# Patient Record
Sex: MALE | Race: WHITE | Employment: OTHER | RURAL
[De-identification: names, ages, dates, MRNs, and addresses within clinical notes are randomized per-mention and may not be internally consistent; named-entity substitution may affect disease eponyms.]

---

## 2022-11-06 ENCOUNTER — APPOINTMENT (OUTPATIENT)
Dept: GENERAL RADIOLOGY | Age: 62
End: 2022-11-06
Attending: EMERGENCY MEDICINE
Payer: COMMERCIAL

## 2022-11-06 ENCOUNTER — HOSPITAL ENCOUNTER (OUTPATIENT)
Age: 62
Setting detail: OBSERVATION
Discharge: HOME | End: 2022-11-07
Attending: EMERGENCY MEDICINE | Admitting: INTERNAL MEDICINE
Payer: COMMERCIAL

## 2022-11-06 DIAGNOSIS — R55 SYNCOPE AND COLLAPSE: Primary | ICD-10-CM

## 2022-11-06 LAB
ANION GAP SERPL CALC-SCNC: 12 MMOL/L (ref 5–15)
APPEARANCE UR: CLEAR
BACTERIA URNS QL MICRO: NEGATIVE /HPF
BASOPHILS # BLD: 0 K/UL (ref 0–0.1)
BASOPHILS NFR BLD: 0 % (ref 0–1)
BILIRUB UR QL: NEGATIVE
BUN SERPL-MCNC: 23 MG/DL (ref 6–20)
BUN/CREAT SERPL: 22 (ref 12–20)
CALCIUM SERPL-MCNC: 9.2 MG/DL (ref 8.5–10.1)
CHLORIDE SERPL-SCNC: 101 MMOL/L (ref 97–108)
CO2 SERPL-SCNC: 29 MMOL/L (ref 21–32)
COLOR UR: NORMAL
CREAT SERPL-MCNC: 1.04 MG/DL (ref 0.7–1.3)
DIFFERENTIAL METHOD BLD: NORMAL
EOSINOPHIL # BLD: 0.1 K/UL (ref 0–0.4)
EOSINOPHIL NFR BLD: 1 % (ref 0–7)
EPITH CASTS URNS QL MICRO: NORMAL /LPF
ERYTHROCYTE [DISTWIDTH] IN BLOOD BY AUTOMATED COUNT: 12.3 % (ref 11.5–14.5)
ETHANOL SERPL-MCNC: <10 MG/DL
GLUCOSE SERPL-MCNC: 124 MG/DL (ref 65–100)
GLUCOSE UR STRIP.AUTO-MCNC: NEGATIVE MG/DL
HCT VFR BLD AUTO: 37.8 % (ref 36.6–50.3)
HGB BLD-MCNC: 13 G/DL (ref 12.1–17)
HGB UR QL STRIP: NEGATIVE
IMM GRANULOCYTES # BLD AUTO: 0 K/UL (ref 0–0.04)
IMM GRANULOCYTES NFR BLD AUTO: 0 % (ref 0–0.5)
KETONES UR QL STRIP.AUTO: NEGATIVE MG/DL
LEUKOCYTE ESTERASE UR QL STRIP.AUTO: NEGATIVE
LYMPHOCYTES # BLD: 1.7 K/UL (ref 0.8–3.5)
LYMPHOCYTES NFR BLD: 23 % (ref 12–49)
MCH RBC QN AUTO: 31.3 PG (ref 26–34)
MCHC RBC AUTO-ENTMCNC: 34.4 G/DL (ref 30–36.5)
MCV RBC AUTO: 90.9 FL (ref 80–99)
MONOCYTES # BLD: 0.6 K/UL (ref 0–1)
MONOCYTES NFR BLD: 9 % (ref 5–13)
NEUTS SEG # BLD: 4.9 K/UL (ref 1.8–8)
NEUTS SEG NFR BLD: 67 % (ref 32–75)
NITRITE UR QL STRIP.AUTO: NEGATIVE
NRBC # BLD: 0 K/UL (ref 0–0.01)
NRBC BLD-RTO: 0 PER 100 WBC
PH UR STRIP: 6.5 [PH] (ref 5–8)
PLATELET # BLD AUTO: 169 K/UL (ref 150–400)
PMV BLD AUTO: 9.6 FL (ref 8.9–12.9)
POTASSIUM SERPL-SCNC: 3.5 MMOL/L (ref 3.5–5.1)
PROT UR STRIP-MCNC: NEGATIVE MG/DL
RBC # BLD AUTO: 4.16 M/UL (ref 4.1–5.7)
RBC #/AREA URNS HPF: NORMAL /HPF (ref 0–5)
SODIUM SERPL-SCNC: 142 MMOL/L (ref 136–145)
SP GR UR REFRACTOMETRY: 1.01 (ref 1–1.03)
TROPONIN-HIGH SENSITIVITY: 7 NG/L (ref 0–76)
UA: UC IF INDICATED,UAUC: NORMAL
UROBILINOGEN UR QL STRIP.AUTO: 0.2 EU/DL (ref 0.2–1)
WBC # BLD AUTO: 7.3 K/UL (ref 4.1–11.1)
WBC URNS QL MICRO: NORMAL /HPF (ref 0–4)

## 2022-11-06 PROCEDURE — 36415 COLL VENOUS BLD VENIPUNCTURE: CPT

## 2022-11-06 PROCEDURE — 80048 BASIC METABOLIC PNL TOTAL CA: CPT

## 2022-11-06 PROCEDURE — 84484 ASSAY OF TROPONIN QUANT: CPT

## 2022-11-06 PROCEDURE — G0378 HOSPITAL OBSERVATION PER HR: HCPCS

## 2022-11-06 PROCEDURE — 93005 ELECTROCARDIOGRAM TRACING: CPT

## 2022-11-06 PROCEDURE — 85025 COMPLETE CBC W/AUTO DIFF WBC: CPT

## 2022-11-06 PROCEDURE — 74011250636 HC RX REV CODE- 250/636: Performed by: EMERGENCY MEDICINE

## 2022-11-06 PROCEDURE — 81001 URINALYSIS AUTO W/SCOPE: CPT

## 2022-11-06 PROCEDURE — 82077 ASSAY SPEC XCP UR&BREATH IA: CPT

## 2022-11-06 PROCEDURE — 99285 EMERGENCY DEPT VISIT HI MDM: CPT

## 2022-11-06 PROCEDURE — 71045 X-RAY EXAM CHEST 1 VIEW: CPT

## 2022-11-06 RX ORDER — ACETAMINOPHEN 325 MG/1
650 TABLET ORAL
Status: DISCONTINUED | OUTPATIENT
Start: 2022-11-06 | End: 2022-11-07 | Stop reason: HOSPADM

## 2022-11-06 RX ORDER — FLUTICASONE FUROATE 27.5 UG/1
2 SPRAY, METERED NASAL DAILY
COMMUNITY

## 2022-11-06 RX ORDER — OLMESARTAN MEDOXOMIL AND HYDROCHLOROTHIAZIDE 40/25 40; 25 MG/1; MG/1
1 TABLET ORAL DAILY
COMMUNITY
End: 2022-11-07

## 2022-11-06 RX ORDER — ASPIRIN 81 MG/1
81 TABLET ORAL DAILY
COMMUNITY

## 2022-11-06 RX ORDER — HYDRALAZINE HYDROCHLORIDE 20 MG/ML
10 INJECTION INTRAMUSCULAR; INTRAVENOUS
Status: DISCONTINUED | OUTPATIENT
Start: 2022-11-06 | End: 2022-11-07

## 2022-11-06 RX ORDER — ATORVASTATIN CALCIUM 20 MG/1
20 TABLET, FILM COATED ORAL DAILY
COMMUNITY

## 2022-11-06 RX ORDER — CETIRIZINE HYDROCHLORIDE 10 MG/1
10 TABLET ORAL DAILY
COMMUNITY

## 2022-11-06 RX ADMIN — SODIUM CHLORIDE 1000 ML: 9 INJECTION, SOLUTION INTRAVENOUS at 18:37

## 2022-11-06 NOTE — ED PROVIDER NOTES
EMERGENCY DEPARTMENT HISTORY AND PHYSICAL EXAM      Date: 11/6/2022  Patient Name: Laureen Miller    History of Presenting Illness     Chief Complaint   Patient presents with    Syncope       History Provided By: Patient    HPI: Laureen Miller, 57 y.o. male with PMHx significant for hypertension, high cholesterol, presents via EMS to the ED with cc of syncope. Patient reports he was working outside all day. He then went to party this evening. He reports he had several beers. He then reports he became very hot and lightheaded and then he passed out. Event was witnessed by bystanders. He was assisted back up and then passed out again. Bystanders attempted to find a pulse and were unable to find a radial pulse and started CPR. However after about 5 compressions he woke up. He denies any chest pain or shortness of breath. He is currently without complaints. He thinks he might of been dehydrated from working outside in the heat all day. Despite being in November it went up to 80 degrees today. He denies any abdominal pain. No nausea or vomiting. PMHx: Significant for hypertension, high cholesterol  PSHx: No pertinent past surgical history  Social Hx: non Smoker. Occasionally drinks alcohol. There are no other complaints, changes, or physical findings at this time. PCP: Carli Luna MD    No current facility-administered medications on file prior to encounter. Current Outpatient Medications on File Prior to Encounter   Medication Sig Dispense Refill    cetirizine (ZyrTEC) 10 mg tablet Take 10 mg by mouth daily. atorvastatin (LIPITOR) 20 mg tablet Take 20 mg by mouth daily. fluticasone (Flonase Sensimist) 27.5 mcg/actuation nasal spray 2 Sprays by Nasal route daily. aspirin delayed-release 81 mg tablet Take 81 mg by mouth daily.          Past History     Past Medical History:  Past Medical History:   Diagnosis Date    Hyperlipidemia     Hypertension        Past Surgical History:  History reviewed. No pertinent surgical history. Family History:  History reviewed. No pertinent family history. Social History:  Social History     Tobacco Use    Smoking status: Never    Smokeless tobacco: Never   Substance Use Topics    Alcohol use: Yes    Drug use: Never       Allergies:  No Known Allergies      Review of Systems   Review of Systems   Constitutional:  Negative for activity change, chills and fever. HENT:  Negative for congestion and sore throat. Eyes:  Negative for pain and redness. Respiratory:  Negative for cough, chest tightness and shortness of breath. Cardiovascular:  Negative for chest pain and palpitations. Gastrointestinal:  Negative for abdominal pain, diarrhea, nausea and vomiting. Genitourinary:  Negative for dysuria, frequency and urgency. Musculoskeletal:  Negative for back pain and neck pain. Skin:  Negative for rash. Neurological:  Positive for syncope and light-headedness. Negative for headaches. Psychiatric/Behavioral:  Negative for confusion. All other systems reviewed and are negative. Physical Exam   Physical Exam  Vitals and nursing note reviewed. Constitutional:       General: He is not in acute distress. Appearance: He is well-developed. He is not diaphoretic. HENT:      Head: Normocephalic. Nose: Nose normal.      Mouth/Throat:      Pharynx: No oropharyngeal exudate. Eyes:      General: No scleral icterus. Conjunctiva/sclera: Conjunctivae normal.      Pupils: Pupils are equal, round, and reactive to light. Neck:      Thyroid: No thyromegaly. Vascular: No JVD. Trachea: No tracheal deviation. Cardiovascular:      Rate and Rhythm: Normal rate and regular rhythm. Heart sounds: No murmur heard. No friction rub. No gallop. Pulmonary:      Effort: Pulmonary effort is normal. No respiratory distress. Breath sounds: Normal breath sounds. No stridor. No wheezing or rales.    Abdominal: General: Bowel sounds are normal. There is no distension. Palpations: Abdomen is soft. Tenderness: There is no abdominal tenderness. There is no guarding or rebound. Musculoskeletal:         General: Normal range of motion. Cervical back: Normal range of motion and neck supple. Lymphadenopathy:      Cervical: No cervical adenopathy. Skin:     General: Skin is warm and dry. Findings: No erythema or rash. Neurological:      Mental Status: He is alert and oriented to person, place, and time. Cranial Nerves: No cranial nerve deficit. Motor: No abnormal muscle tone. Coordination: Coordination normal.   Psychiatric:         Behavior: Behavior normal.           Diagnostic Study Results     Labs -     No results found for this or any previous visit (from the past 12 hour(s)). Radiologic Studies -   DUPLEX CAROTID BILATERAL   Final Result      MRI BRAIN WO CONT   Final Result   1. No acute or significant intracranial abnormality. XR CHEST SNGL V   Final Result   Dilated bowel loops elevating the left hemidiaphragm. CT Results  (Last 48 hours)      None          CXR Results  (Last 48 hours)      None              Medical Decision Making   I am the first provider for this patient. I reviewed the vital signs, available nursing notes, past medical history, past surgical history, family history and social history. Vital Signs-Reviewed the patient's vital signs. No data found. Pulse Oximetry Analysis - 97% on RA    Cardiac Monitor:   Rate: 87 bpm  Rhythm: Normal Sinus Rhythm        ED EKG interpretation: 5:51 PM  Rhythm: normal sinus rhythm; and regular . Rate (approx.): 92; Axis: normal; AK Interval: normal; QRS interval: normal ; ST/T wave: non-specific changes; This EKG was interpreted by Dave Mark MD,ED Provider.       Records Reviewed: Nursing Notes and Ambulance Run Sheet    Provider Notes (Medical Decision Making):   DDX: dehydration, orthostatic hypotension, acs, dysrhythmia, electrolyte abnormality, alcohol intoxication  ED Course:   Initial assessment performed. The patients presenting problems have been discussed, and they are in agreement with the care plan formulated and outlined with them. I have encouraged them to ask questions as they arise throughout their visit. ED Course as of 11/09/22 0814   Ilda Palma Nov 06, 2022   1902 To the hospitalist Dr. Alesha Henley. Will admit to hospitalist service for observation for syncopal event. Work-up unremarkable. CBC and CMP unremarkable other than BUN and creatinine ratio slightly elevated. Suggesting dehydration. Negative troponin. No acute EKG changes. Negative alcohol level. [CH]          ED Course User Index  [CH] Benjamin Haley MD  [VG] Kobe Martini MD                   I have also put together some discharge instructions for them that include: 1) educational information regarding their diagnosis, 2) how to care for their diagnosis at home, as well a 3) list of reasons why they would want to return to the ED prior to their follow-up appointment, should their condition change. Written by Tesfaye Reyes MD        Critical Care Time:   0    Disposition:  Admit    PLAN:  1. Discharge Medication List as of 11/7/2022  3:48 PM        START taking these medications    Details   olmesartan (Benicar) 40 mg tablet Take 1 Tablet by mouth daily. , No Print, Disp-1 Tablet, R-0           CONTINUE these medications which have NOT CHANGED    Details   cetirizine (ZyrTEC) 10 mg tablet Take 10 mg by mouth daily. , Historical Med      atorvastatin (LIPITOR) 20 mg tablet Take 20 mg by mouth daily. , Historical Med      fluticasone (Flonase Sensimist) 27.5 mcg/actuation nasal spray 2 Sprays by Nasal route daily. , Historical Med      aspirin delayed-release 81 mg tablet Take 81 mg by mouth daily. , Historical Med           STOP taking these medications       olmesartan-hydroCHLOROthiazide (BENICAR HCT) 40-25 mg per tablet Comments:   Reason for Stoppin.   Follow-up Information       Follow up With Specialties Details Why Laurence Araujo MD Family Medicine   [address] [facility] [address] [id number] [phone]      Roger Gupta MD Family Medicine Follow up in 4 day(s) Follow-up @ 1:45. Bring insurance card and ID with you. [address] [place], [place] [id number] [phone]            Return to ED if worse     Diagnosis     Clinical Impression:   1. Syncope and collapse              Please note that this dictation was completed with Uni2, the computer voice recognition software. Quite often unanticipated grammatical, syntax, homophones, and other interpretive errors are inadvertently transcribed by the computer software. Please disregard these errors. Please excuse any errors that have escaped final proofreading.

## 2022-11-06 NOTE — ED TRIAGE NOTES
Pt arrives via EMS after experiencing a syncopal episode. He states he was at a party and suddenly felt hot and had LOC. Bystander CPR initiated d/t reportedly \"slow pulse\". Pt was arouasable, he sat up and witness reports that he had LOC again. Vitals stable en route, pt A/Ox4 upon arrival with no complaints.

## 2022-11-06 NOTE — Clinical Note
Patient Class[de-identified] OBSERVATION [104]   Type of Bed: Remote Telemetry [29]   Cardiac Monitoring Required?: Yes   Reason for Observation: syncope   Admitting Diagnosis: Syncope [402811]   Admitting Physician: Jabari Renee [[id number]]   Attending Physician: Jabari Renee [[id number]]

## 2022-11-07 ENCOUNTER — APPOINTMENT (OUTPATIENT)
Dept: ULTRASOUND IMAGING | Age: 62
End: 2022-11-07
Attending: INTERNAL MEDICINE
Payer: COMMERCIAL

## 2022-11-07 ENCOUNTER — HOSPITAL ENCOUNTER (OUTPATIENT)
Dept: NON INVASIVE DIAGNOSTICS | Age: 62
Setting detail: OBSERVATION
Discharge: HOME | End: 2022-11-07
Attending: INTERNAL MEDICINE
Payer: COMMERCIAL

## 2022-11-07 ENCOUNTER — TRANSCRIBE ORDER (OUTPATIENT)
Dept: SCHEDULING | Age: 62
End: 2022-11-07

## 2022-11-07 ENCOUNTER — TELEPHONE (OUTPATIENT)
Dept: FAMILY MEDICINE CLINIC | Age: 62
End: 2022-11-07

## 2022-11-07 ENCOUNTER — APPOINTMENT (OUTPATIENT)
Dept: MRI IMAGING | Age: 62
End: 2022-11-07
Attending: INTERNAL MEDICINE
Payer: COMMERCIAL

## 2022-11-07 VITALS
BODY MASS INDEX: 30.99 KG/M2 | SYSTOLIC BLOOD PRESSURE: 170 MMHG | WEIGHT: 221.34 LBS | RESPIRATION RATE: 20 BRPM | DIASTOLIC BLOOD PRESSURE: 89 MMHG | OXYGEN SATURATION: 96 % | TEMPERATURE: 97.5 F | HEIGHT: 71 IN | HEART RATE: 62 BPM

## 2022-11-07 DIAGNOSIS — R55 SYNCOPE: ICD-10-CM

## 2022-11-07 DIAGNOSIS — R55 SYNCOPE: Primary | ICD-10-CM

## 2022-11-07 PROBLEM — E78.5 HYPERLIPIDEMIA: Chronic | Status: ACTIVE | Noted: 2022-11-07

## 2022-11-07 PROBLEM — I10 PRIMARY HYPERTENSION: Chronic | Status: ACTIVE | Noted: 2022-11-07

## 2022-11-07 LAB
AMPHET UR QL SCN: NEGATIVE
ANION GAP SERPL CALC-SCNC: 7 MMOL/L (ref 5–15)
ATRIAL RATE: 92 BPM
BARBITURATES UR QL SCN: NEGATIVE
BASOPHILS # BLD: 0 K/UL (ref 0–0.1)
BASOPHILS NFR BLD: 0 % (ref 0–1)
BENZODIAZ UR QL: NEGATIVE
BUN SERPL-MCNC: 17 MG/DL (ref 6–20)
BUN/CREAT SERPL: 15 (ref 12–20)
CALCIUM SERPL-MCNC: 9.5 MG/DL (ref 8.5–10.1)
CALCULATED P AXIS, ECG09: 48 DEGREES
CALCULATED R AXIS, ECG10: 22 DEGREES
CALCULATED T AXIS, ECG11: 23 DEGREES
CANNABINOIDS UR QL SCN: POSITIVE
CHLORIDE SERPL-SCNC: 104 MMOL/L (ref 97–108)
CO2 SERPL-SCNC: 31 MMOL/L (ref 21–32)
COCAINE UR QL SCN: NEGATIVE
CREAT SERPL-MCNC: 1.11 MG/DL (ref 0.7–1.3)
D DIMER PPP FEU-MCNC: 0.34 MG/L FEU (ref 0–0.65)
DIAGNOSIS, 93000: NORMAL
DIFFERENTIAL METHOD BLD: NORMAL
DRUG SCRN COMMENT,DRGCM: ABNORMAL
EOSINOPHIL # BLD: 0.1 K/UL (ref 0–0.4)
EOSINOPHIL NFR BLD: 1 % (ref 0–7)
ERYTHROCYTE [DISTWIDTH] IN BLOOD BY AUTOMATED COUNT: 12.4 % (ref 11.5–14.5)
GLUCOSE SERPL-MCNC: 147 MG/DL (ref 65–100)
HCT VFR BLD AUTO: 38.2 % (ref 36.6–50.3)
HGB BLD-MCNC: 13.2 G/DL (ref 12.1–17)
IMM GRANULOCYTES # BLD AUTO: 0 K/UL (ref 0–0.04)
IMM GRANULOCYTES NFR BLD AUTO: 0 % (ref 0–0.5)
LEFT CCA DIST DIAS: 15.8 CM/S
LEFT CCA DIST SYS: 72.1 CM/S
LEFT CCA PROX DIAS: 21.5 CM/S
LEFT CCA PROX SYS: 100.3 CM/S
LEFT ECA DIAS: 12.76 CM/S
LEFT ECA SYS: 91.9 CM/S
LEFT ICA DIST DIAS: 18.9 CM/S
LEFT ICA DIST SYS: 52.6 CM/S
LEFT ICA MID DIAS: 18.2 CM/S
LEFT ICA MID SYS: 54.7 CM/S
LEFT ICA PROX DIAS: 16.5 CM/S
LEFT ICA PROX SYS: 76.8 CM/S
LEFT ICA/CCA SYS: 1.07 NO UNITS
LEFT VERTEBRAL DIAS: 12.83 CM/S
LEFT VERTEBRAL SYS: 53.8 CM/S
LYMPHOCYTES # BLD: 1.4 K/UL (ref 0.8–3.5)
LYMPHOCYTES NFR BLD: 25 % (ref 12–49)
MAGNESIUM SERPL-MCNC: 2.2 MG/DL (ref 1.6–2.4)
MCH RBC QN AUTO: 31.6 PG (ref 26–34)
MCHC RBC AUTO-ENTMCNC: 34.6 G/DL (ref 30–36.5)
MCV RBC AUTO: 91.4 FL (ref 80–99)
METHADONE UR QL: NEGATIVE
MONOCYTES # BLD: 0.4 K/UL (ref 0–1)
MONOCYTES NFR BLD: 7 % (ref 5–13)
NEUTS SEG # BLD: 3.6 K/UL (ref 1.8–8)
NEUTS SEG NFR BLD: 67 % (ref 32–75)
NRBC # BLD: 0 K/UL (ref 0–0.01)
NRBC BLD-RTO: 0 PER 100 WBC
OPIATES UR QL: NEGATIVE
P-R INTERVAL, ECG05: 160 MS
PCP UR QL: NEGATIVE
PLATELET # BLD AUTO: 164 K/UL (ref 150–400)
PMV BLD AUTO: 9.6 FL (ref 8.9–12.9)
POTASSIUM SERPL-SCNC: 3.8 MMOL/L (ref 3.5–5.1)
Q-T INTERVAL, ECG07: 380 MS
QRS DURATION, ECG06: 98 MS
QTC CALCULATION (BEZET), ECG08: 469 MS
RBC # BLD AUTO: 4.18 M/UL (ref 4.1–5.7)
RIGHT CCA DIST DIAS: 19.2 CM/S
RIGHT CCA DIST SYS: 98.4 CM/S
RIGHT CCA PROX DIAS: 23.8 CM/S
RIGHT CCA PROX SYS: 91.4 CM/S
RIGHT ECA DIAS: 12.52 CM/S
RIGHT ECA SYS: 97 CM/S
RIGHT ICA DIST DIAS: 24.6 CM/S
RIGHT ICA DIST SYS: 66.1 CM/S
RIGHT ICA MID DIAS: 17.8 CM/S
RIGHT ICA MID SYS: 54.9 CM/S
RIGHT ICA PROX DIAS: 22.4 CM/S
RIGHT ICA PROX SYS: 91.3 CM/S
RIGHT ICA/CCA SYS: 0.9 NO UNITS
RIGHT VERTEBRAL DIAS: 17.6 CM/S
RIGHT VERTEBRAL SYS: 46.7 CM/S
SODIUM SERPL-SCNC: 142 MMOL/L (ref 136–145)
TROPONIN-HIGH SENSITIVITY: 7 NG/L (ref 0–76)
VENTRICULAR RATE, ECG03: 92 BPM
WBC # BLD AUTO: 5.5 K/UL (ref 4.1–11.1)

## 2022-11-07 PROCEDURE — 85025 COMPLETE CBC W/AUTO DIFF WBC: CPT

## 2022-11-07 PROCEDURE — 70551 MRI BRAIN STEM W/O DYE: CPT

## 2022-11-07 PROCEDURE — G0378 HOSPITAL OBSERVATION PER HR: HCPCS

## 2022-11-07 PROCEDURE — 93271 ECG/MONITORING AND ANALYSIS: CPT

## 2022-11-07 PROCEDURE — 74011250636 HC RX REV CODE- 250/636: Performed by: INTERNAL MEDICINE

## 2022-11-07 PROCEDURE — 80048 BASIC METABOLIC PNL TOTAL CA: CPT

## 2022-11-07 PROCEDURE — 83735 ASSAY OF MAGNESIUM: CPT

## 2022-11-07 PROCEDURE — 93880 EXTRACRANIAL BILAT STUDY: CPT

## 2022-11-07 PROCEDURE — 84484 ASSAY OF TROPONIN QUANT: CPT

## 2022-11-07 PROCEDURE — 74011250637 HC RX REV CODE- 250/637: Performed by: INTERNAL MEDICINE

## 2022-11-07 PROCEDURE — 36415 COLL VENOUS BLD VENIPUNCTURE: CPT

## 2022-11-07 PROCEDURE — 93306 TTE W/DOPPLER COMPLETE: CPT

## 2022-11-07 PROCEDURE — 94760 N-INVAS EAR/PLS OXIMETRY 1: CPT

## 2022-11-07 PROCEDURE — 80307 DRUG TEST PRSMV CHEM ANLYZR: CPT

## 2022-11-07 PROCEDURE — 96372 THER/PROPH/DIAG INJ SC/IM: CPT

## 2022-11-07 PROCEDURE — 85379 FIBRIN DEGRADATION QUANT: CPT

## 2022-11-07 RX ORDER — OLMESARTAN MEDOXOMIL 40 MG/1
40 TABLET ORAL DAILY
Qty: 1 TABLET | Refills: 0 | Status: SHIPPED
Start: 2022-11-07

## 2022-11-07 RX ORDER — SODIUM CHLORIDE 0.9 % (FLUSH) 0.9 %
5-40 SYRINGE (ML) INJECTION AS NEEDED
Status: DISCONTINUED | OUTPATIENT
Start: 2022-11-07 | End: 2022-11-07 | Stop reason: HOSPADM

## 2022-11-07 RX ORDER — ASPIRIN 81 MG/1
81 TABLET ORAL DAILY
Status: DISCONTINUED | OUTPATIENT
Start: 2022-11-07 | End: 2022-11-07 | Stop reason: HOSPADM

## 2022-11-07 RX ORDER — POLYETHYLENE GLYCOL 3350 17 G/17G
17 POWDER, FOR SOLUTION ORAL DAILY PRN
Status: DISCONTINUED | OUTPATIENT
Start: 2022-11-07 | End: 2022-11-07 | Stop reason: HOSPADM

## 2022-11-07 RX ORDER — ENOXAPARIN SODIUM 100 MG/ML
40 INJECTION SUBCUTANEOUS DAILY
Status: DISCONTINUED | OUTPATIENT
Start: 2022-11-07 | End: 2022-11-07 | Stop reason: HOSPADM

## 2022-11-07 RX ORDER — ONDANSETRON 2 MG/ML
4 INJECTION INTRAMUSCULAR; INTRAVENOUS
Status: DISCONTINUED | OUTPATIENT
Start: 2022-11-07 | End: 2022-11-07 | Stop reason: HOSPADM

## 2022-11-07 RX ORDER — ONDANSETRON 4 MG/1
4 TABLET, ORALLY DISINTEGRATING ORAL
Status: DISCONTINUED | OUTPATIENT
Start: 2022-11-07 | End: 2022-11-07 | Stop reason: HOSPADM

## 2022-11-07 RX ORDER — FLUTICASONE PROPIONATE 50 MCG
2 SPRAY, SUSPENSION (ML) NASAL
Status: DISCONTINUED | OUTPATIENT
Start: 2022-11-07 | End: 2022-11-07 | Stop reason: HOSPADM

## 2022-11-07 RX ORDER — SODIUM CHLORIDE 0.9 % (FLUSH) 0.9 %
5-40 SYRINGE (ML) INJECTION EVERY 8 HOURS
Status: DISCONTINUED | OUTPATIENT
Start: 2022-11-07 | End: 2022-11-07 | Stop reason: HOSPADM

## 2022-11-07 RX ADMIN — ASPIRIN 81 MG: 81 TABLET, COATED ORAL at 09:44

## 2022-11-07 RX ADMIN — ENOXAPARIN SODIUM 40 MG: 100 INJECTION SUBCUTANEOUS at 09:44

## 2022-11-07 NOTE — ROUTINE PROCESS
Bedside and Verbal shift change report given to Kishore juarez (oncoming nurse) by Mason Peguero (offgoing nurse). Report included the following information SBAR, Kardex, ED Summary, Intake/Output, MAR, Recent Results, and Cardiac Rhythm NSR.

## 2022-11-07 NOTE — ED NOTES
Bedside shift change report given to [address] (oncoming nurse) by Margaret Galeazzi, RN (offgoing nurse). Report included the following information SBAR, Kardex and ED Summary. HHA/Patient/Significant Other

## 2022-11-07 NOTE — PROGRESS NOTES
Bedside shift change report given to LUIS Mendieta RN (oncoming nurse) by Martina Cain RN  (offgoing nurse). Report included the following information to include SBAR, ED summary of events, MAR, VS and cardiac status.

## 2022-11-07 NOTE — ED NOTES
Pt pain assessed. Offered use of restroom and assistance as necessary. Pt declined but is aware of need for admission. Pt offered repositioning in bed for comfort. Assessed pt's ability to access possessions, everything within reach of pt. Pt reminded to use call bell as necessary, report any changes in status. Pt thanked for allowing care. Bed locked in lowest position, side rails up as necessary.

## 2022-11-07 NOTE — PROGRESS NOTES
2209:  Paged provider for VS notification. Left message to return call.     2241: Left message for Dr. Juan David Ya to return call for notification of VS

## 2022-11-07 NOTE — PROGRESS NOTES
Spiritual Care Assessment/Progress Note  Ayaan Galvan      NAME: Liberty Vergara      MRN: [id number]  AGE: 57 y.o.  SEX: male  Latter day Affiliation: Other   Language: English     11/7/2022     Total Time (in minutes): 11     Spiritual Assessment begun in [facility] MED/SURG through conversation with:         [x]Patient        [] Family    [] Friend(s)        Reason for Consult: Initial/Spiritual assessment, patient floor     Spiritual beliefs: (Please include comment if needed)     [x] Identifies with a dinah tradition:         [] Supported by a dinah community:            [] Claims no spiritual orientation:           [] Seeking spiritual identity:                [] Adheres to an individual form of spirituality:           [] Not able to assess:                           Identified resources for coping:      [] Prayer                               [] Music                  [] Guided Imagery     [x] Family/friends                 [] Pet visits     [] Devotional reading                         [] Unknown     [] Other:                                               Interventions offered during this visit: (See comments for more details)    Patient Interventions: Affirmation of emotions/emotional suffering, Affirmation of dinah, Initial/Spiritual assessment, patient floor, Prayer (assurance of)           Plan of Care:     [x] Support spiritual and/or cultural needs    [] Support AMD and/or advance care planning process      [] Support grieving process   [] Coordinate Rites and/or Rituals    [] Coordination with community clergy   [] No spiritual needs identified at this time   [] Detailed Plan of Care below (See Comments)  [] Make referral to Music Therapy  [] Make referral to Pet Therapy     [] Make referral to Addiction services  [] Make referral to [facility]  [] Make referral to Spiritual Care Partner  [] No future visits requested        [] Contact Spiritual Care for further referrals     Comments: INITIAL SPIRITUAL ASSESSMENT IN   Provided empathic listening and spiritual support  Advised of  Availability    [address] [place] [address]

## 2022-11-07 NOTE — PROGRESS NOTES
Discharge instructions reviewed with patient  Personal belongings returned: n/a  Home meds returned: n/a  To front entrance via w/c  Discharged home with  spouse @ [id number]

## 2022-11-07 NOTE — ROUTINE PROCESS
TRANSFER - IN REPORT:    Verbal report received from [facility] (name) on Jose Root  being received from ED (unit) for routine progression of care      Report consisted of patients Situation, Background, Assessment and   Recommendations(SBAR). Information from the following report(s) SBAR, Kardex, ED Summary, and Recent Results was reviewed with the receiving nurse. Opportunity for questions and clarification was provided. Assessment completed upon patients arrival to unit and care assumed.

## 2022-11-07 NOTE — PROGRESS NOTES
Transition of Care (RICHARD) Plan:  Discharged home. No needs identified. Holtor monitor placed after discharged from the unit as outpatient. RICHARD Transportation:      How is patient being transported at discharge? POV/Wife    When?  11/7/22    Is transport scheduled? NA    Follow-up appointment and transportation:     PCP? Hailey Sargent MD     Specialist?    Time/Date? November 11, 2022 1:45PM    Who is transporting to the follow-up appointment? Wife/POV     Is transport for follow up appointment scheduled? NA    Communication plan (with patient/family): Who is being called? Patient     What number(s) is to be used? [phone] (Mobile)        What service provider is calling for [facility] services? PCP office    When are they calling? Probably 24 - 48 hours prior to appointment      Click here to [address] including selection of the Healthcare Decision Maker Relationship (ie \"Primary\")  @[email or web address]     Care Management Interventions  PCP Verified by CM: Yes Hailey Sargent MD)  Last Visit to PCP: 06/15/22  Palliative Care Criteria Met (RRAT>21 & CHF Dx)?: No (No MD order)  Mode of Transport at Discharge:  Other (see comment) (POV)  Transition of Care Consult (CM Consult): Discharge Planning  Discharge Durable Medical Equipment: No  Physical Therapy Consult: No  Occupational Therapy Consult: No  Speech Therapy Consult: No  Support Systems: Spouse/Significant Other  Confirm Follow Up Transport: Family  The Plan for Transition of Care is Related to the Following Treatment Goals : Treat syncope  Discharge Location  Patient Expects to be Discharged to[de-identified] Home

## 2022-11-07 NOTE — ROUTINE PROCESS
Primary Nurse Marlys Jimenez RN and Tasha Noonan RN performed a dual skin assessment on this patient No impairment noted  Nilton score is 23    Pt defer removing shorts; voiced his skin is fine there.

## 2022-11-07 NOTE — PROGRESS NOTES
Problem: Falls - Risk of  Goal: *Absence of Falls  Description: Document Jonah Raza Fall Risk and appropriate interventions in the flowsheet.   Outcome: Resolved/Met

## 2022-11-07 NOTE — TELEPHONE ENCOUNTER
discharge nurse calls to schedule hospital follow up. Pt was in [facility] for Syncope. Follow up should be within 5 dys. Pt scheduled for 11/11/2022. Pt's wife is a recently established pt here and wants pt to see Dr. Gigi Mann as pcp. He would be new to provider for his follow up appt.      Best number for LEA BOYER call is

## 2022-11-07 NOTE — H&P
[facility]   Admission History & Physical        11/7/2022 8:15 AM  Patient: Kristina Norwood 1960  PCP: Stephen Osullivan MD    HISTORY  Chief Complaint:   Chief Complaint   Patient presents with    Syncope       HPI: 57 y.o. male presenting for admission to [facility] for further evaluation and treatment for Syncope. He  has a past medical history of Hyperlipidemia and Hypertension. Patient presented to the ED last evening following an abrupt syncopal episode without provocation. He is followed by his PCP in [place] for treatment of hypertension and hyperlipidemia. His blood pressure is usually controlled at home but slightly elevated during office visits, in the 145/92 range. He has not had problems with dizziness, poor coordination, focal neurologic symptoms, falls. Had no documentation of vascular disease. He had an unremarkable stress test at age 46. He has every 6-month follow-ups with laboratory work being assessed. There is no history of peptic disease or GI bleed. He has no complaint with palpitation or orthostasis. Had symptoms of chest pain, orthopnea, PND, edema. He had a fairly unremarkable day yesterday. He did some chores in the [employer] yesterday afternoon and it was slightly hot and he admits to not drinking fluids too much. He took his routine medications in the morning. Last evening he was at an indoor party and drank 1 beer. His wife was next to him and he suddenly slumped and she assisted him to the floor. He aroused fairly promptly without complaint and remained on the floor for period of time. When attempting to stand up he again became lightheaded diaphoretic and had complete syncope. During that episode his eyes rolled upwards and he was flaccid throughout. There was no loss of bowel or urine continence. There was no tonic-clonic spasm appreciated. He was assessed by several nurses at the party and was not felt to have a palpable pulse.   CPR was initiated and he aroused after several compressions. No focal deficits could be appreciated. He remained on the floor until EMS arrived. Signs were apparently unremarkable. The patient was described as diaphoretic and warm after the second episode. No arrhythmia or hypotension was identified. Laboratory panel was unremarkable. EKG was noted for a sinus rhythm with some abnormal ST segment depression. Serum troponin was normal.  He received 1 L of normal saline in the ED. Following admission orthostatic blood pressures revealed some hypertension but no orthostatic changes on standing. Past Medical History:  Past Medical History:   Diagnosis Date    Hyperlipidemia     Hypertension        Past Surgical History:  History reviewed. No pertinent surgical history. Medication:  Prior to Admission medications    Medication Sig Start Date End Date Taking? Authorizing Provider   cetirizine (ZyrTEC) 10 mg tablet Take 10 mg by mouth daily. Yes Other, MD Warren   olmesartan-hydroCHLOROthiazide (BENICAR HCT) 40-25 mg per tablet Take 1 Tablet by mouth daily. Indications: takes 37.5/25mg   Yes Other, MD Warren   atorvastatin (LIPITOR) 20 mg tablet Take 20 mg by mouth daily. Yes Other, MD Warren   fluticasone (Flonase Sensimist) 27.5 mcg/actuation nasal spray 2 Sprays by Nasal route daily. Yes Other, MD Warren   aspirin delayed-release 81 mg tablet Take 81 mg by mouth daily. Yes Other, MD Warren       Allergies:  No Known Allergies    Social History:  Social History     Tobacco Use    Smoking status: Never    Smokeless tobacco: Never   Substance Use Topics    Alcohol use: Yes    Drug use: Never       Family History:  History reviewed. No pertinent family history.     ROS:  Total of 12 systems reviewed as follows:  POSITIVE= bolded text  Negative = text not bolded       General:  fever, chills, sweats, generalized weakness, weight loss/gain, loss of appetite   Eyes:    blurred vision, eye pain, loss of vision, double vision  ENT:    rhinorrhea, pharyngitis   Respiratory:  cough, sputum production, SOB, ONTIVEROS, wheezing, pleuritic pain   Cardiology:   chest pain, palpitations, orthopnea, PND, edema, syncope   Gastrointestinal:  abdominal pain , N/V, diarrhea, dysphagia, constipation, bleeding   Genitourinary:  frequency, urgency, dysuria, hematuria, incontinence, prostatism   Muskuloskeletal: arthralgia, myalgia, back pain  Hematology:   easy bruising, nose or gum bleeding, lymphadenopathy   Dermatological: rash, ulceration, pruritis, color change / jaundice  Endocrine:   hot flashes or polydipsia   Neurological:  headache, dizziness, confusion, focal weakness, paresthesia, speech difficulties, memory loss, gait difficulty  Psychological: feelings of anxiety, depression, agitation      PHYSICAL EXAM:  Patient Vitals for the past 24 hrs:   Temp Pulse Resp BP SpO2   11/07/22 0715 98 °F (36.7 °C) 65 20 (!) 164/97 97 %   11/07/22 0400 97.9 °F (36.6 °C) (!) 59 20 (!) 160/85 98 %   11/07/22 0024 97.9 °F (36.6 °C) 71 20 (!) 158/80 99 %   11/06/22 2146 -- 73 -- (!) 196/104 --   11/06/22 2141 -- 66 20 (!) 183/98 --   11/06/22 2118 -- 68 20 (!) 177/94 --   11/06/22 2049 97.7 °F (36.5 °C) 65 20 (!) 181/88 99 %   11/06/22 2030 -- 66 18 (!) 153/86 97 %   11/06/22 2010 -- 70 20 (!) 157/99 97 %   11/06/22 2000 -- 80 (!) 39 (!) 158/83 99 %   11/06/22 1943 -- 72 22 (!) 152/83 98 %   11/06/22 1921 -- 88 (!) 34 (!) 163/149 99 %   11/06/22 1906 -- 78 21 (!) 149/95 97 %   11/06/22 1845 -- 90 24 (!) 166/86 97 %   11/06/22 1826 -- 84 17 (!) 142/75 98 %   11/06/22 1759 -- 87 22 (!) 158/84 97 %   11/06/22 1757 98.6 °F (37 °C) 89 -- (!) 153/87 98 %   11/06/22 1756 -- 91 25 (!) 153/87 96 %   11/06/22 1755 -- 84 20 (!) 166/87 99 %   11/06/22 1752 -- 94 25 (!) 166/82 94 %   11/06/22 1747 -- -- 17 -- --       General:    Alert, cooperative, no distress, appears stated age.      HEENT: Atraumatic, anicteric sclerae, pink conjunctivae     No oral ulcers, mucosa moist, throat clear, dentition fair  Neck:  Supple, symmetrical;   thyroid non tender. No carotid bruit  Lungs:   Clear to auscultation bilaterally. No wheezing or rhonchi. No rales. Chest wall:  No tenderness. No accessory muscle use. Heart:   Regular rhythm. No  murmur. No edema  Abdomen:   Soft, non-tender. Not distended. Bowel sounds normal  Extremities: No cyanosis. No clubbing      Capillary refill normal,  Radial pulse 2+,  DP 2+  Skin:     Not pale. Not jaundiced. No rashes   Psych:  Not depressed. Not anxious or agitated. Neurologic: EOMs intact. No facial asymmetry. No aphasia or slurred speech. Symmetrical strength, Sensation grossly intact. Alert and oriented X 4. Lab Data Reviewed:    Recent Results (from the past 24 hour(s))   CBC WITH AUTOMATED DIFF    Collection Time: 11/06/22  6:03 PM   Result Value Ref Range    WBC 7.3 4.1 - 11.1 K/uL    RBC 4.16 4.10 - 5.70 M/uL    HGB 13.0 12.1 - 17.0 g/dL    HCT 37.8 36.6 - 50.3 %    MCV 90.9 80.0 - 99.0 FL    MCH 31.3 26.0 - 34.0 PG    MCHC 34.4 30.0 - 36.5 g/dL    RDW 12.3 11.5 - 14.5 %    PLATELET 384 311 - 686 K/uL    MPV 9.6 8.9 - 12.9 FL    NRBC 0.0 0  WBC    ABSOLUTE NRBC 0.00 0.00 - 0.01 K/uL    NEUTROPHILS 67 32 - 75 %    LYMPHOCYTES 23 12 - 49 %    MONOCYTES 9 5 - 13 %    EOSINOPHILS 1 0 - 7 %    BASOPHILS 0 0 - 1 %    IMMATURE GRANULOCYTES 0 0.0 - 0.5 %    ABS. NEUTROPHILS 4.9 1.8 - 8.0 K/UL    ABS. LYMPHOCYTES 1.7 0.8 - 3.5 K/UL    ABS. MONOCYTES 0.6 0.0 - 1.0 K/UL    ABS. EOSINOPHILS 0.1 0.0 - 0.4 K/UL    ABS. BASOPHILS 0.0 0.0 - 0.1 K/UL    ABS. IMM.  GRANS. 0.0 0.00 - 0.04 K/UL    DF AUTOMATED     METABOLIC PANEL, BASIC    Collection Time: 11/06/22  6:03 PM   Result Value Ref Range    Sodium 142 136 - 145 mmol/L    Potassium 3.5 3.5 - 5.1 mmol/L    Chloride 101 97 - 108 mmol/L    CO2 29 21 - 32 mmol/L    Anion gap 12 5 - 15 mmol/L    Glucose 124 (H) 65 - 100 mg/dL    BUN 23 (H) 6 - 20 MG/DL    Creatinine 1.04 0.70 - 1.30 MG/DL    BUN/Creatinine ratio 22 (H) 12 - 20      eGFR >60 >60 ml/min/1.73m2    Calcium 9.2 8.5 - 10.1 MG/DL   TROPONIN-HIGH SENSITIVITY    Collection Time: 11/06/22  6:03 PM   Result Value Ref Range    Troponin-High Sensitivity 7 0 - 76 ng/L   ETHYL ALCOHOL    Collection Time: 11/06/22  6:03 PM   Result Value Ref Range    ALCOHOL(ETHYL),SERUM <10 <10 MG/DL   URINALYSIS W/ REFLEX CULTURE    Collection Time: 11/06/22  8:50 PM    Specimen: Urine   Result Value Ref Range    Color YELLOW/STRAW      Appearance CLEAR CLEAR      Specific gravity 1.010 1.003 - 1.030      pH (UA) 6.5 5.0 - 8.0      Protein Negative NEG mg/dL    Glucose Negative NEG mg/dL    Ketone Negative NEG mg/dL    Bilirubin Negative NEG      Blood Negative NEG      Urobilinogen 0.2 0.2 - 1.0 EU/dL    Nitrites Negative NEG      Leukocyte Esterase Negative NEG      WBC 0-4 0 - 4 /hpf    RBC 0-5 0 - 5 /hpf    Epithelial cells FEW FEW /lpf    Bacteria Negative NEG /hpf    UA:UC IF INDICATED CULTURE NOT INDICATED BY UA RESULT CNI         EKG: NSR 92 bpm, NSSTC anterior leads, No prev    Radiology:  XR CHEST SNGL V   Final Result   Dilated bowel loops elevating the left hemidiaphragm. Care Plan discussed with:   Patient x    Family Wife in room    RN x         Consultant      Expected  Disposition:   Home with Family x   HH/PT/OT/RN    SNF/LTC    [facility]      TOTAL TIME:  54 Minutes      Comments    x Reviewed previous records   >50% of visit spent in counseling and coordination of care x Discussion with patient and/or family and questions answered       _______________________________________________________  Given the patient's current clinical presentation, I have a high level of concern for decompensation if discharged from the emergency department. Complex decision making was performed, which includes reviewing the patient's available past medical records, laboratory results, and x-ray films.         My assessment of this patient's clinical condition and my plan of care is as follows.     ASSESSMENT / PLAN    Principal Problem:    Syncope (11/6/2022)  Abrupt episode yesterday without apparent provocation  Follow-up CBC and fecal occult blood to rule out acute bleed  Monitor orthostatic blood pressure  Assess carotid arteries with carotid duplex  Assess for CNS abnormality with MRI scan  Telemetry monitoring, plan discharged on 4-day Holter  D-dimer to rule out pulmonary embolus  Repeat troponin     Active Problems:    Primary hypertension (11/7/2022)  Monitor, check for orthostasis  Plan discharged on half dose Benicar HCTZ 40/25      Hyperlipidemia (11/7/2022)  Resume Lipitor on discharge      SAFETY:   Code Status:Full  DVT prophylaxis:Lovenox  Stress Ulcer prophylaxis: Not Indicated  Bladder catheter:no  Family Contact Info:  Primary Emergency Contact: Petty Cedeño: [facility] Room 200/01  Disposition: TBD, likely home when stable  Admission status:  Observation    -Tentative plan of care discussed with patient / family, who demonstrated understanding and is in agreement to the above  -Case was reviewed with the ED Provider, Goldy Flannery, [id number] MD  [facility] Hospitalist  [phone]

## 2022-11-07 NOTE — PROGRESS NOTES
Care Management Interventions  PCP Verified by CM: Yes Timbo Hendrix MD)  Last Visit to PCP: 06/15/22  Palliative Care Criteria Met (RRAT>21 & CHF Dx)?: No (No MD order)  Mode of Transport at Discharge: Other (see comment) (POV)  Transition of Care Consult (CM Consult): Discharge Planning  Discharge Durable Medical Equipment: No  Physical Therapy Consult: No  Occupational Therapy Consult: No  Speech Therapy Consult: No  Support Systems: Spouse/Significant Other  Confirm Follow Up Transport: Family  The Plan for Transition of Care is Related to the Following Treatment Goals : Treat syncope  Discharge Location  Patient Expects to be Discharged to[de-identified] Home    Patient lives at home with his wife Al Garcia. They live in [place] but also have a home in [place]. Patient is normally very active. Does NOT use any DME. Attends a spinning class. Patient also just moved here from [place] to [facility]. He would like to establish a PCO maribell with Dr. Surendra Matamoros when discharged. Will set that up. Patient is currently in observation status. [place] [facility] Observation Notice (Sarah Isaac) provided to patient/representative with verbal explanation of the notice. Time allotted for questions regarding the notice. Patient /representative provided a completed copy of the VOON notice. Copy placed on bedside chart. Patient/family told to contact CM for any questions or concerns during his stay.      Reason for Admission:  Syncope                      RUR Score:       N/A PT IN OBS STATUS      RRAT: 0 LOW               Plan for utilizing home health:   TBD        PCP: First and Last name:  Narayan Osei MD     Name of Practice:    Are you a current patient: Yes/No: Yes   Approximate date of last visit: About 5 months    Can you participate in a virtual visit with your PCP: Yes                    Current Advanced Directive/Advance Care Plan: Full Code      Healthcare Decision Maker:   Click here to complete [address] including selection of the Healthcare Decision Maker Relationship (ie \"Primary\")                             Transition of Care Plan:                Home when medically stable. Advance Care Planning     General Advance Care Planning (ACP) Conversation      Date of Conversation: 11/6/2022  Conducted with: Patient with Decision Making Capacity    Healthcare Decision Maker:   No healthcare decision makers have been documented. Click here to complete [address] including selection of the Healthcare Decision Maker Relationship (ie \"Primary\")  Today we documented Decision Maker(s). The patient will provide ACP documents. Content/Action Overview:    Has ACP document(s) NOT on file - requested patient to provide  Reviewed DNR/DNI and patient   Topics discussed: treatment goals  Additional Comments : n/a      Length of Voluntary ACP Conversation in minutes:  16 minutes    Niko

## 2022-11-07 NOTE — ED NOTES
Report given to  [facility], all questions answered. Patient transported to , condition unchanged. NAD noted when leaving department.

## 2022-11-08 LAB
ECHO AO ROOT DIAM: 3.3 CM
ECHO AO ROOT INDEX: 1.5 CM/M2
ECHO AV PEAK GRADIENT: 7 MMHG
ECHO AV PEAK VELOCITY: 1.3 M/S
ECHO EST RA PRESSURE: 10 MMHG
ECHO LA DIAMETER INDEX: 1.64 CM/M2
ECHO LA DIAMETER: 3.6 CM
ECHO LA TO AORTIC ROOT RATIO: 1.09
ECHO LV E' SEPTAL VELOCITY: 9 CM/S
ECHO LV FRACTIONAL SHORTENING: 29 % (ref 28–44)
ECHO LV INTERNAL DIMENSION DIASTOLE INDEX: 2.05 CM/M2
ECHO LV INTERNAL DIMENSION DIASTOLIC: 4.5 CM (ref 4.2–5.9)
ECHO LV INTERNAL DIMENSION SYSTOLIC INDEX: 1.45 CM/M2
ECHO LV INTERNAL DIMENSION SYSTOLIC: 3.2 CM
ECHO LV IVSD: 1.1 CM (ref 0.6–1)
ECHO LV MASS 2D: 175 G (ref 88–224)
ECHO LV MASS INDEX 2D: 79.6 G/M2 (ref 49–115)
ECHO LV POSTERIOR WALL DIASTOLIC: 1.1 CM (ref 0.6–1)
ECHO LV RELATIVE WALL THICKNESS RATIO: 0.49
ECHO LVOT AREA: 4.2 CM2
ECHO LVOT DIAM: 2.3 CM
ECHO MV A VELOCITY: 0.82 M/S
ECHO MV E DECELERATION TIME (DT): 207.4 MS
ECHO MV E VELOCITY: 0.65 M/S
ECHO MV E/A RATIO: 0.79
ECHO MV E/E' SEPTAL: 7.22
ECHO RIGHT VENTRICULAR SYSTOLIC PRESSURE (RVSP): 21 MMHG
ECHO TV REGURGITANT MAX VELOCITY: 1.67 M/S
ECHO TV REGURGITANT PEAK GRADIENT: 13 MMHG

## 2022-11-08 PROCEDURE — 93306 TTE W/DOPPLER COMPLETE: CPT | Performed by: INTERNAL MEDICINE

## 2022-11-09 NOTE — TELEPHONE ENCOUNTER
Care Transitions Initial Follow Up Call    Outreach made within 2 business days of discharge: Yes    Patient: Anthony Linder Patient : 1960   MRN: [id number]  Reason for Admission: Syncope  Discharge Date: 22       Spoke with: Patient, he reports that he saw a GP today and canceled this appt for 22.

## 2022-11-09 NOTE — DISCHARGE SUMMARY
[facility]  Hospitalist Discharge Summary    Patient ID:  Niles Caba  [id number]  57 y.o.  1960    PCP on record: Gage Kelsey MD    Admit date: 11/6/2022  Discharge date and time: 11/7/2022     DISCHARGE DIAGNOSIS:    Principal Problem:    Syncope (11/6/2022)    Active Problems:    Primary hypertension (11/7/2022)    Hyperlipidemia (11/7/2022)    CONSULTATIONS:  None    Excerpted HPI from H&P of Maritza Recio MD:   57 y.o. male presenting for admission to [facility] for further evaluation and treatment for Syncope. He  has a past medical history of Hyperlipidemia and Hypertension. Patient presented to the ED last evening following an abrupt syncopal episode without provocation. He is followed by his PCP in [place] for treatment of hypertension and hyperlipidemia. His blood pressure is usually controlled at home but slightly elevated during office visits, in the 145/92 range. He has not had problems with dizziness, poor coordination, focal neurologic symptoms, falls. Had no documentation of vascular disease. He had an unremarkable stress test at age 46. He has every 6-month follow-ups with laboratory work being assessed. There is no history of peptic disease or GI bleed. He has no complaint with palpitation or orthostasis. Had symptoms of chest pain, orthopnea, PND, edema. He had a fairly unremarkable day yesterday. He did some chores in the garage yesterday afternoon and it was slightly hot and he admits to not drinking fluids too much. He took his routine medications in the morning. Last evening he was at an indoor party and drank 1 beer. His wife was next to him and he suddenly slumped and she assisted him to the floor. He aroused fairly promptly without complaint and remained on the floor for period of time. When attempting to stand up he again became lightheaded diaphoretic and had complete syncope.   During that episode his eyes rolled upwards and he was flaccid throughout. There was no loss of bowel or urine continence. There was no tonic-clonic spasm appreciated. He was assessed by several nurses at the party and was not felt to have a palpable pulse. CPR was initiated and he aroused after several compressions. No focal deficits could be appreciated. He remained on the floor until EMS arrived. Signs were apparently unremarkable. The patient was described as diaphoretic and warm after the second episode. No arrhythmia or hypotension was identified. Laboratory panel was unremarkable. EKG was noted for a sinus rhythm with some abnormal ST segment depression. Serum troponin was normal.  He received 1 L of normal saline in the ED. Following admission orthostatic blood pressures revealed some hypertension but no orthostatic changes on standing.   ______________________________________________________________________  DISCHARGE SUMMARY/HOSPITAL COURSE:  for full details see H&P, daily progress notes, labs, consult notes.      Principal Problem:    Syncope (11/6/2022)  Abrupt episode yesterday without apparent provocation  Follow-up CBC and fecal occult blood to rule out acute bleed  Monitor orthostatic blood pressure, standing BP was actually higher (no drop appreciated)  Assess carotid arteries with carotid duplex - no large vessel occlusions  D-dimer with normal range - essentially rules out possible Pulmonary Embolus  Assess for CNS abnormality with MRI scan - no acute findings  Telemetry monitoring w/o in-house arrhythmia documented, discharged with 4-day Event Monitor  Repeat troponin were not elevated  Dehydration / Orthostasis was not demonstrated  Acute blood loss was not documented  Arrhythmia was not documented - d/c on Event for additional data  Pt to return for recurrent symptoms  No driving or climbing till follow up appt  Pt assessed by PT/OT without weakness identified  I did discuss details of hospitalization with his long standing PCP in [place] - Dr Anderson Roots:    Primary hypertension (11/7/2022)  Monitor, check for orthostasis  Clarified pre-adm tx as Benicar 40mg daily with Dyazide 37.5/25 daily  Stop Dyazide for now  Reduce Benicar dose to 20mg for now and follow (1/2 current tab)       Hyperlipidemia (11/7/2022)  Resume Lipitor on discharge   _______________________________________________________________________  Patient seen and examined by me on discharge day. Pertinent Findings:  Visit Vitals  BP (!) 170/89 (BP 1 Location: Left arm, BP Patient Position: Sitting)   Pulse 62   Temp 97.5 °F (36.4 °C)   Resp 20   Ht 5' 11\" (1.803 m)   Wt 100.4 kg (221 lb 5.5 oz)   SpO2 96%   BMI 30.87 kg/m²     Gen:    Not in distress  Chest: Nonlabored respiration, Clear lungs  CVS:   Regular rhythm.   No edema  Abd:  Soft, not distended, not tender  Neuro:  Alert, nonfocal    LABS:   11/6/22 18:03 11/7/22 09:06   WBC 7.3 5.5   NRBC 0.0 0.0   RBC 4.16 4.18   HGB 13.0 13.2   HCT 37.8 38.2   MCV 90.9 91.4   MCH 31.3 31.6   MCHC 34.4 34.6   RDW 12.3 12.4   PLATELET 854 909   MPV 9.6 9.6   NEUTROPHILS 67 67   LYMPHOCYTES 23 25   MONOCYTES 9 7   EOSINOPHILS 1 1   BASOPHILS 0 0        11/6/22 20:50   Color YELLOW/STRAW   Appearance CLEAR   Specific gravity 1.010   pH (UA) 6.5   Protein Negative   Glucose Negative   Ketone Negative   Blood Negative   Bilirubin Negative   Urobilinogen 0.2   Nitrites Negative   Leukocyte Esterase Negative   Epithelial cells FEW   WBC 0-4   RBC 0-5   Bacteria Negative      Latest Reference Range & Units 11/7/22 09:06   D-dimer 0.00 - 0.65 mg/L FEU 0.34      11/6/22 18:03 11/7/22 09:06   Sodium 142 142   Potassium 3.5 3.8   Chloride 101 104   CO2 29 31   Anion gap 12 7   Glucose 124 (H) 147 (H)   BUN 23 (H) 17   Creatinine 1.04 1.11   BUN/Creatinine ratio 22 (H) 15   Calcium 9.2 9.5   Magnesium  2.2   eGFR >60 >60   Troponin-High Sensitivity 7 7      11/6/22 18:03 11/7/22 10:40   ALCOHOL(ETHYL),SERUM <10    AMPHETAMINES Negative   BARBITURATES  Negative   BENZODIAZEPINES  Negative   COCAINE  Negative   METHADONE  Negative   OPIATES  Negative   PCP(PHENCYCLIDINE)  Negative   THC (TH-CANNABINOL)  Positive ! EKG: NSR 92 bpm, NSSTC anterior leads, No prev    RADIOLOGY  pCXR 11/6:  : Elevated left hemidiaphragm with dilated bowel loops under the  diaphragm. Lungs are clear. Heart size and mediastinal contours are normal.   IMPRESSION: Dilated bowel loops elevating the left hemidiaphragm. MRI BRAIN 11/7:  The ventricles are normal in size and position. The brain parenchyma has normal  signal characteristics for age. There is no acute infarct, hemorrhage,  extra-axial fluid collection, or mass effect. There is no cerebellar tonsillar  herniation. Expected arterial flow-voids are present. The paranasal sinuses, mastoid air cells, and middle ears are clear. The orbital  contents are within normal limits. No significant osseous or scalp lesions are identified. IMPRESSION: No acute or significant intracranial abnormali    CAROTIC DUPLEX 11/7:  The right ICA is normal  The right vertebral is antegrade. There is minimal stenosis in the left ICA  The left vertebral is antegrade    ECHO 11/7:  Left Ventricle: Normal left ventricular systolic function with a visually estimated EF of 55 - 60%. Left ventricle size is normal. Mildly increased wall thickness. Normal wall motion. Normal diastolic function for age. 96HR EVENT MONITOR 9/7-11  Pending result  _______________________________________________________________________  DISCHARGE MEDICATIONS:   Discharge Medication List as of 11/7/2022  3:48 PM        START taking these medications    Details   olmesartan (Benicar) 40 mg tablet Take 1 Tablet by mouth daily. , No Print, Disp-1 Tablet, R-0           CONTINUE these medications which have NOT CHANGED    Details   cetirizine (ZyrTEC) 10 mg tablet Take 10 mg by mouth daily. , Historical Med      atorvastatin (LIPITOR) 20 mg tablet Take 20 mg by mouth daily. , Historical Med      fluticasone (Flonase Sensimist) 27.5 mcg/actuation nasal spray 2 Sprays by Nasal route daily. , Historical Med      aspirin delayed-release 81 mg tablet Take 81 mg by mouth daily. , Historical Med           STOP taking these medications       olmesartan-hydroCHLOROthiazide (BENICAR HCT) 40-25 mg per tablet Comments:   Reason for Stopping:           Stop taking Dyazide daily as taken PTA. Cont tx Olmesartan 20mg (1/2  40mg tab) daily  Then advance treatment if needed. My Recommended  Diet: Cardiac  Activity: Ad Maddie  Wound Care: none (no driving or climbing until follow up appt)  Follow-up labs: routine    ______________________________________________________________________  DISPOSITION:    Home with Family: x   Home with HH/PT/OT/RN:    SNF/LTC:    [facility]:    OTHER:        Condition at Discharge:  Stable  _____________________________________________________________________  Follow up with:   PCP : Cony Bryson MD  Follow-up Information       Follow up With Specialties Details Why Karolynn Nyhan, MD Family Medicine Pt will plan appt as able - faxed records to office [phone]  [address] [place] [address]. [id number] [phone]      Dejah Hinkle MD Family Medicine Follow up in 4 day(s) Follow-up @ 1:45.   Bring insurance card and ID with you. [address] [id number]  [phone]            Record faxed to Dr. Julieta Lucas [phone]      Total time in minutes spent coordinating this discharge (includes going over instructions, follow-up, prescriptions, and preparing report for sign off to her PCP) :35 minutes    Signed:  Clarissa Bob MD  [facility] Hospitalist  [phone]

## 2023-01-15 ENCOUNTER — TELEPHONE (OUTPATIENT)
Dept: CARDIOLOGY CLINIC | Age: 63
End: 2023-01-15

## 2023-01-15 NOTE — TELEPHONE ENCOUNTER
Sheryl- please offer next available cardiology consultation with [facility] for abnormal holter monitor and syncope- I have discussed with [facility]- copy to her.

## 2023-01-18 ENCOUNTER — TELEPHONE (OUTPATIENT)
Dept: CARDIOLOGY CLINIC | Age: 63
End: 2023-01-18

## 2023-01-18 NOTE — TELEPHONE ENCOUNTER
Verified Patient with two identifiers. Spoke with patient to schedule appointment regarding test results per kathleen. Patient stated that he is already seeing a [facility] cardiologist and has follow up scheduled with them.